# Patient Record
Sex: FEMALE | Race: OTHER | HISPANIC OR LATINO | URBAN - METROPOLITAN AREA
[De-identification: names, ages, dates, MRNs, and addresses within clinical notes are randomized per-mention and may not be internally consistent; named-entity substitution may affect disease eponyms.]

---

## 2019-07-11 ENCOUNTER — OUTPATIENT (OUTPATIENT)
Dept: OUTPATIENT SERVICES | Facility: HOSPITAL | Age: 74
LOS: 1 days | End: 2019-07-11
Payer: MEDICARE

## 2019-07-11 VITALS — HEIGHT: 61 IN | WEIGHT: 130.95 LBS

## 2019-07-11 DIAGNOSIS — Z01.818 ENCOUNTER FOR OTHER PREPROCEDURAL EXAMINATION: ICD-10-CM

## 2019-07-11 DIAGNOSIS — Z95.810 PRESENCE OF AUTOMATIC (IMPLANTABLE) CARDIAC DEFIBRILLATOR: Chronic | ICD-10-CM

## 2019-07-11 LAB
ALBUMIN SERPL ELPH-MCNC: 4.2 G/DL — SIGNIFICANT CHANGE UP (ref 3.3–5.2)
ALP SERPL-CCNC: 64 U/L — SIGNIFICANT CHANGE UP (ref 40–120)
ALT FLD-CCNC: 14 U/L — SIGNIFICANT CHANGE UP
ANION GAP SERPL CALC-SCNC: 11 MMOL/L — SIGNIFICANT CHANGE UP (ref 5–17)
APTT BLD: 32.3 SEC — SIGNIFICANT CHANGE UP (ref 27.5–36.3)
AST SERPL-CCNC: 19 U/L — SIGNIFICANT CHANGE UP
BILIRUB SERPL-MCNC: 0.3 MG/DL — LOW (ref 0.4–2)
BUN SERPL-MCNC: 27 MG/DL — HIGH (ref 8–20)
CALCIUM SERPL-MCNC: 9.8 MG/DL — SIGNIFICANT CHANGE UP (ref 8.6–10.2)
CHLORIDE SERPL-SCNC: 103 MMOL/L — SIGNIFICANT CHANGE UP (ref 98–107)
CO2 SERPL-SCNC: 24 MMOL/L — SIGNIFICANT CHANGE UP (ref 22–29)
CREAT SERPL-MCNC: 1.24 MG/DL — SIGNIFICANT CHANGE UP (ref 0.5–1.3)
GLUCOSE SERPL-MCNC: 114 MG/DL — SIGNIFICANT CHANGE UP (ref 70–115)
HCT VFR BLD CALC: 37.3 % — SIGNIFICANT CHANGE UP (ref 34.5–45)
HGB BLD-MCNC: 12.2 G/DL — SIGNIFICANT CHANGE UP (ref 11.5–15.5)
INR BLD: 1.03 RATIO — SIGNIFICANT CHANGE UP (ref 0.88–1.16)
MAGNESIUM SERPL-MCNC: 2.1 MG/DL — SIGNIFICANT CHANGE UP (ref 1.6–2.6)
MCHC RBC-ENTMCNC: 31 PG — SIGNIFICANT CHANGE UP (ref 27–34)
MCHC RBC-ENTMCNC: 32.7 GM/DL — SIGNIFICANT CHANGE UP (ref 32–36)
MCV RBC AUTO: 94.7 FL — SIGNIFICANT CHANGE UP (ref 80–100)
PLATELET # BLD AUTO: 207 K/UL — SIGNIFICANT CHANGE UP (ref 150–400)
POTASSIUM SERPL-MCNC: 5.4 MMOL/L — HIGH (ref 3.5–5.3)
POTASSIUM SERPL-SCNC: 5.4 MMOL/L — HIGH (ref 3.5–5.3)
PROT SERPL-MCNC: 7.6 G/DL — SIGNIFICANT CHANGE UP (ref 6.6–8.7)
PROTHROM AB SERPL-ACNC: 11.8 SEC — SIGNIFICANT CHANGE UP (ref 10–12.9)
RBC # BLD: 3.94 M/UL — SIGNIFICANT CHANGE UP (ref 3.8–5.2)
RBC # FLD: 12.4 % — SIGNIFICANT CHANGE UP (ref 10.3–14.5)
SODIUM SERPL-SCNC: 138 MMOL/L — SIGNIFICANT CHANGE UP (ref 135–145)
WBC # BLD: 6.88 K/UL — SIGNIFICANT CHANGE UP (ref 3.8–10.5)
WBC # FLD AUTO: 6.88 K/UL — SIGNIFICANT CHANGE UP (ref 3.8–10.5)

## 2019-07-11 PROCEDURE — 36415 COLL VENOUS BLD VENIPUNCTURE: CPT

## 2019-07-11 PROCEDURE — 85027 COMPLETE CBC AUTOMATED: CPT

## 2019-07-11 PROCEDURE — 85610 PROTHROMBIN TIME: CPT

## 2019-07-11 PROCEDURE — 93005 ELECTROCARDIOGRAM TRACING: CPT

## 2019-07-11 PROCEDURE — 93010 ELECTROCARDIOGRAM REPORT: CPT

## 2019-07-11 PROCEDURE — 85730 THROMBOPLASTIN TIME PARTIAL: CPT

## 2019-07-11 PROCEDURE — 80053 COMPREHEN METABOLIC PANEL: CPT

## 2019-07-11 PROCEDURE — G0463: CPT

## 2019-07-11 PROCEDURE — 86803 HEPATITIS C AB TEST: CPT

## 2019-07-11 PROCEDURE — 83735 ASSAY OF MAGNESIUM: CPT

## 2019-07-11 RX ORDER — SODIUM CHLORIDE 9 MG/ML
65 INJECTION INTRAMUSCULAR; INTRAVENOUS; SUBCUTANEOUS
Refills: 0 | Status: DISCONTINUED | OUTPATIENT
Start: 2019-07-16 | End: 2019-07-26

## 2019-07-11 NOTE — H&P PST ADULT - NEGATIVE NEUROLOGICAL SYMPTOMS
no transient paralysis/no confusion/no hemiparesis/no facial palsy/no weakness/no generalized seizures/no headache/no paresthesias/no syncope/no vertigo/no tremors/no difficulty walking/no loss of sensation/no loss of consciousness/no focal seizures

## 2019-07-11 NOTE — H&P PST ADULT - HISTORY OF PRESENT ILLNESS
72 y/o  and [place]ian speaking female presents to PST to undergo LHC to evaluate for EF 20%  with worsening CHF symptom and then surgical LV lead for ICD    HPI: EF declining now at 20%, with known failed LV lead implant 2009 States daily compliance with Entresto     used for all portions of PST      PMH: ICD(pt does not known ) for CHF with failed LV lead 2009, HTN, HLD, DM    ASA 3  Mallampati 2  BR 8.4%  GFR 43

## 2019-07-11 NOTE — H&P PST ADULT - NEUROLOGICAL DETAILS
sensation intact/responds to pain/responds to verbal commands/deep reflexes intact/cranial nerves intact/no spontaneous movement/alert and oriented x 3

## 2019-07-11 NOTE — H&P PST ADULT - REASON FOR ADMISSION
[facility] to evaluate for EF 20%  with worsening CHF symptom and then surgical LV lead for ICD

## 2019-07-11 NOTE — H&P PST ADULT - NSICDXPASTMEDICALHX_GEN_ALL_CORE_FT
PAST MEDICAL HISTORY:  History of cardiomyopathy     HLD (hyperlipidemia)     HTN (hypertension)     Left bundle branch block (LBBB)     Left ventricular ejection fraction less than 20%     Obesity     Severe left ventricular systolic dysfunction     Uses Italian as primary spoken language

## 2019-07-11 NOTE — H&P PST ADULT - ASSESSMENT
[facility] to evaluate for EF 20%  with worsening CHF symptom and then surgical LV lead for ICD    One hour pre-procedure hydration for GFR<60 in pt with HF  Repeat BMP   Escort for discharge   used with all communication to address questions/concerns

## 2019-07-11 NOTE — H&P PST ADULT - NSICDXPASTSURGICALHX_GEN_ALL_CORE_FT
PAST SURGICAL HISTORY:  ICD (implantable cardioverter-defibrillator) in place 2009 pt uinsure of manufacturere LV lead failed

## 2019-07-12 LAB
HCV AB S/CO SERPL IA: 0.12 S/CO — SIGNIFICANT CHANGE UP (ref 0–0.99)
HCV AB SERPL-IMP: SIGNIFICANT CHANGE UP

## 2019-07-16 ENCOUNTER — OUTPATIENT (OUTPATIENT)
Dept: OUTPATIENT SERVICES | Facility: HOSPITAL | Age: 74
LOS: 1 days | End: 2019-07-16
Payer: MEDICARE

## 2019-07-16 VITALS
HEART RATE: 74 BPM | RESPIRATION RATE: 16 BRPM | OXYGEN SATURATION: 98 % | SYSTOLIC BLOOD PRESSURE: 142 MMHG | TEMPERATURE: 98 F | DIASTOLIC BLOOD PRESSURE: 65 MMHG

## 2019-07-16 VITALS — HEART RATE: 93 BPM

## 2019-07-16 DIAGNOSIS — Z95.810 PRESENCE OF AUTOMATIC (IMPLANTABLE) CARDIAC DEFIBRILLATOR: Chronic | ICD-10-CM

## 2019-07-16 DIAGNOSIS — R06.9 UNSPECIFIED ABNORMALITIES OF BREATHING: ICD-10-CM

## 2019-07-16 PROCEDURE — C1769: CPT

## 2019-07-16 PROCEDURE — C1887: CPT

## 2019-07-16 PROCEDURE — 93458 L HRT ARTERY/VENTRICLE ANGIO: CPT

## 2019-07-16 PROCEDURE — 99152 MOD SED SAME PHYS/QHP 5/>YRS: CPT

## 2019-07-16 PROCEDURE — C1760: CPT

## 2019-07-16 RX ORDER — SITAGLIPTIN 50 MG/1
1 TABLET, FILM COATED ORAL
Qty: 0 | Refills: 0 | DISCHARGE

## 2019-07-16 RX ORDER — FERROUS SULFATE 325(65) MG
0 TABLET ORAL
Qty: 0 | Refills: 0 | DISCHARGE

## 2019-07-16 RX ORDER — ASPIRIN/CALCIUM CARB/MAGNESIUM 324 MG
81 TABLET ORAL ONCE
Refills: 0 | Status: COMPLETED | OUTPATIENT
Start: 2019-07-16 | End: 2019-07-16

## 2019-07-16 RX ORDER — SACUBITRIL AND VALSARTAN 24; 26 MG/1; MG/1
1 TABLET, FILM COATED ORAL
Qty: 0 | Refills: 0 | DISCHARGE

## 2019-07-16 RX ORDER — CARVEDILOL PHOSPHATE 80 MG/1
1 CAPSULE, EXTENDED RELEASE ORAL
Qty: 0 | Refills: 0 | DISCHARGE

## 2019-07-16 RX ORDER — SODIUM CHLORIDE 9 MG/ML
1000 INJECTION INTRAMUSCULAR; INTRAVENOUS; SUBCUTANEOUS
Refills: 0 | Status: DISCONTINUED | OUTPATIENT
Start: 2019-07-16 | End: 2019-07-31

## 2019-07-16 RX ORDER — SIMVASTATIN 20 MG/1
1 TABLET, FILM COATED ORAL
Qty: 0 | Refills: 0 | DISCHARGE

## 2019-07-16 RX ADMIN — Medication 81 MILLIGRAM(S): at 17:37

## 2019-07-16 NOTE — PROGRESS NOTE ADULT - SUBJECTIVE AND OBJECTIVE BOX
Nurse Practitioner Progress note:     INTERVAL HISTORY: 73 year old female with low EF for [facility]       TELEMETRY: NSR 75 bpm    T(C): 36.6 (07-16-19 @ 15:07), Max: 36.6 (07-16-19 @ 15:07)  HR: 74 (07-16-19 @ 15:07) (74 - 74)  BP: 142/65 (07-16-19 @ 15:07) (142/65 - 142/65)  RR: 16 (07-16-19 @ 15:07) (16 - 16)  SpO2: 98% (07-16-19 @ 15:07) (98% - 98%)  Wt(kg): --    PHYSICAL EXAM:  Appearance: Normal	  Cardiovascular: Normal S1 S2, No JVD, No murmurs, No edema  Respiratory: Lungs clear to auscultation	  Psychiatry: A & O x 3, Mood & affect appropriate  Neurologic: Non-focal, A&O X3.  No neuro deficits  Procedure Site: Right groin with angioseal benign.  No bleeding/hematoma/ecchymosis.  + palp pedal pulse      PROCEDURE RESULTS: S/P LHC which revealed normal coronaries (full report to follow) via right groin with angioseal closure    ASSESSMENT/PLAN: 	  -Groin precautions  -Bedrest X 2h  -Resume home meds  -Discharge to home when criteria met

## 2019-07-16 NOTE — DISCHARGE NOTE PROVIDER - HOSPITAL COURSE
73 year old female with EF 20% s/p failed ICD lead who underwent a LHC which revealed normal coronaries in preperation for possible surgical lead implant.

## 2019-07-16 NOTE — DISCHARGE NOTE NURSING/CASE MANAGEMENT/SOCIAL WORK - NSDCDPATPORTLINK_GEN_ALL_CORE
You can access the [facility], offered by [facility], by registering with the following website: [email or web address]

## 2019-07-16 NOTE — CONSULT NOTE ADULT - SUBJECTIVE AND OBJECTIVE BOX
Patient is a 73y old  Female who presents with a chief complaint of shortness of breath and cardiomyopathy      HPI:  73 year old woman with a history of hypertension, hyperlipidemia and NIDDM who has cardiomyopathy and an AICD.  She has had increasing shortness of breath and worsening left ventricular systolic function despite optimal medical therapy.    PAST MEDICAL & SURGICAL HISTORY:  Severe left ventricular systolic dysfunction  Left bundle branch block (LBBB)  Uses Croatian as primary spoken language  HTN (hypertension)  HLD (hyperlipidemia)  Obesity  Left ventricular ejection fraction less than 20%  History of cardiomyopathy  ICD (implantable cardioverter-defibrillator) in place: 2009 pt uinsure of manufacturere LV lead failed      Allergies    No Known Allergies    Intolerances        MEDICATIONS  (STANDING):  Entresto  BID  Coreg 25 BID  Simvastatin 20 Qhs  Januvia 100 mg daily  FeSO4  Calcium    FAMILY HISTORY:      SOCIAL HISTORY:    CIGARETTES: Never    ALCOHOL: Occaisonal    REVIEW OF SYSTEMS:  CONSTITUTIONAL: No fever, weight loss, or fatigue  EYES: No eye pain, visual disturbances, or discharge  ENMT:  No difficulty hearing, tinnitus, vertigo; No sinus or throat pain  NECK: No pain or stiffness  RESPIRATORY: No cough, wheezing, chills or hemoptysis;   CARDIOVASCULAR: No chest pain, palpitations, passing out, dizziness, or leg swelling  GASTROINTESTINAL: No abdominal or epigastric pain. No nausea, vomiting, or hematemesis; No diarrhea or constipation. No melena or hematochezia.  GENITOURINARY: No dysuria, frequency, hematuria, or incontinence  NEUROLOGICAL: No headaches, memory loss, loss of strength, numbness, or tremors  SKIN: No itching, burning, rashes, or lesions   LYMPH Nodes: No enlarged glands  ENDOCRINE: No heat or cold intolerance; No hair loss  MUSCULOSKELETAL: No joint pain or swelling; No muscle, back, or extremity pain  PSYCHIATRIC: No depression, anxiety, mood swings, or difficulty sleeping  HEME/LYMPH: No easy bruising, or bleeding gums  ALLERY AND IMMUNOLOGIC: No hives or eczema	    Vital Signs Last 24 Hrs  T(C): 36.6 (16 Jul 2019 15:07), Max: 36.6 (16 Jul 2019 15:07)  T(F): 97.8 (16 Jul 2019 15:07), Max: 97.8 (16 Jul 2019 15:07)  HR: 93 (16 Jul 2019 22:45) (66 - 93)  BP: 112/55 (16 Jul 2019 22:30) (104/52 - 142/65)  BP(mean): --  RR: 25 (16 Jul 2019 22:30) (16 - 31)  SpO2: 91% (16 Jul 2019 22:30) (91% - 98%)    Daily     Daily     I&O's Detail      PHYSICAL EXAM:  Appearance: Normal, well nourished	  HEENT:   Normal oral mucosa, PERRL, EOMI, sclera non-icteric	  Lymphatic: No cervical lymphadenopathy  Cardiovascular: Normal S1 S2, No JVD, No cardiac murmurs, No carotid bruits, No peripheral edema  Respiratory: Lungs clear to auscultation	  Psychiatry: A & O x 3, Mood & affect appropriate  Gastrointestinal:  Soft, Non-tender, + BS, no bruits	  Skin: No rashes, No ecchymoses, No cyanosis  Neurologic: Grossly non-focal with full strength in all four extremities  Extremities: Normal range of motion, No clubbing, cyanosis or edema  Vascular: Peripheral pulses palpable 2+ bilaterally    LABS:                  I&O's Summary    BNP  RADIOLOGY & ADDITIONAL STUDIES:    Assessment:  73 year old woman with a history of hypertension, hyperlipidemia and NIDDM who has cardiomyopathy and an AICD.  She has had increasing shortness of breath and worsening left ventricular systolic function despite optimal medical therapy.    Plan:  Cardiac catheterization and possible percutaneous intervention recommended.  Risks, benefits, and alternatives reviewed.  Risks including but not limited to MI, death, stroke, bleeding, infection, vessel injury, hematoma, renal failure, allergic reaction, urgent open heart surgery, restenosis and stent thrombosis were reviewed.  All questions answered.  Patient is agreeable to proceed.

## 2019-07-16 NOTE — H&P PST ADULT - HISTORY OF PRESENT ILLNESS
Narrative:     Symptoms:        Anigina (Class):        Ischemic Symptoms:     Heart Failure:        Systolic/Diastolic/Combined:        NYHA Class (within 2 weeks):     Assessment of LVEF (Must be within 6 months):       EF:        Assessed by:        Date:     Prior Cardiac Interventions (LHC, stents, CABG):       PCI's (Date, Stents, Vessels):        CABG (Date, Grafts):     Noninvasive Testing:   Stress Test: Date:        Protocol:        Duration of Exercise:        Symptoms:        EKG Changes:        DTS:        Myocardial Imaging:        Risk Assessment (Low, Medium, High):     Echo (Date, Findings):     Antianginal Therapies:        Beta Blockers:         Calcium Channel Blockers:        Long Acting Nitrates:        Ranexa:     Associated Risk Factors:        Cerebrovascular Disease: N/A       Chronic Lung Disease: N/A       Peripheral Arterial Disease: N/A       Chronic Kidney Disease (if yes, what is GFR): N/A       Uncontrolled Diabetes (if yes, what is HgbA1C or FBS): N/A       Poorly Controlled Hypertension (if yes, what is SBP): N/A       Morbid Obesity (if yes, what is BMI): N/A       History of Recent Ventricular Arrhythmia: N/A       Inability to Ambulate Safely: N/A       Need for Therapeutic Anticoagulation: N/A       Antiplatelet or Contrast Allergy: N/A

## 2019-07-16 NOTE — DISCHARGE NOTE PROVIDER - NSDCCPCAREPLAN_GEN_ALL_CORE_FT
PRINCIPAL DISCHARGE DIAGNOSIS  Diagnosis: Cardiomyopathy  Assessment and Plan of Treatment: medical management

## 2024-06-25 NOTE — DISCHARGE NOTE PROVIDER - CARE PROVIDER_API CALL
Detail Level: Detailed Size Of Lesion In Cm (Optional): 0 Morphology Per Location (Optional): invasive, moderately differentiated Anoop Barreto)  Cardiac Electrophysiology  [address] [place] [address]  Phone: [phone]  Fax: [phone]  Follow Up Time: Body Location Override (Optional - Billing Will Still Be Based On Selected Body Map Location If Applicable): left pretibial region Morphology Per Location (Optional): ulcerated, nodular and infiltrative type Morphology Per Location (Optional): acanthotic, in situ Morphology Per Location (Optional): ulcerated, superficial, and early nodular type Body Location Override (Optional - Billing Will Still Be Based On Selected Body Map Location If Applicable): left radial dorsal hand Morphology Per Location (Optional): moderately differentiated Morphology Per Location (Optional): invasive, well-differentiated Morphology Per Location (Optional): pigmented, in situ